# Patient Record
Sex: MALE | Race: WHITE
[De-identification: names, ages, dates, MRNs, and addresses within clinical notes are randomized per-mention and may not be internally consistent; named-entity substitution may affect disease eponyms.]

---

## 2022-12-10 ENCOUNTER — HOSPITAL ENCOUNTER (EMERGENCY)
Dept: HOSPITAL 54 - ER | Age: 27
Discharge: HOME | End: 2022-12-10
Payer: MEDICAID

## 2022-12-10 VITALS — BODY MASS INDEX: 32.58 KG/M2 | HEIGHT: 68 IN | WEIGHT: 215 LBS

## 2022-12-10 VITALS — SYSTOLIC BLOOD PRESSURE: 135 MMHG | DIASTOLIC BLOOD PRESSURE: 80 MMHG

## 2022-12-10 DIAGNOSIS — R07.9: ICD-10-CM

## 2022-12-10 DIAGNOSIS — R03.0: ICD-10-CM

## 2022-12-10 DIAGNOSIS — U07.1: Primary | ICD-10-CM

## 2022-12-10 PROCEDURE — C9803 HOPD COVID-19 SPEC COLLECT: HCPCS

## 2022-12-10 PROCEDURE — 93005 ELECTROCARDIOGRAM TRACING: CPT

## 2022-12-10 PROCEDURE — 71045 X-RAY EXAM CHEST 1 VIEW: CPT

## 2022-12-10 PROCEDURE — 99285 EMERGENCY DEPT VISIT HI MDM: CPT

## 2022-12-10 PROCEDURE — 87426 SARSCOV CORONAVIRUS AG IA: CPT

## 2022-12-10 NOTE — NUR
BIBS C/O CONGESTION X 2 DAYS +"GREEN MUCUS WHEN COUGHING". AMBULATORY, PLACED 
ON BED, BREATHING EVEN AND UNLABORED.

## 2025-05-28 ENCOUNTER — HOSPITAL ENCOUNTER (EMERGENCY)
Dept: HOSPITAL 54 - ER | Age: 30
Discharge: HOME | End: 2025-05-28
Payer: MEDICAID

## 2025-05-28 VITALS — DIASTOLIC BLOOD PRESSURE: 62 MMHG | SYSTOLIC BLOOD PRESSURE: 132 MMHG | TEMPERATURE: 97.9 F | OXYGEN SATURATION: 99 %

## 2025-05-28 DIAGNOSIS — M25.572: Primary | ICD-10-CM
